# Patient Record
Sex: FEMALE | NOT HISPANIC OR LATINO | Employment: UNEMPLOYED | URBAN - METROPOLITAN AREA
[De-identification: names, ages, dates, MRNs, and addresses within clinical notes are randomized per-mention and may not be internally consistent; named-entity substitution may affect disease eponyms.]

---

## 2023-12-25 ENCOUNTER — HOSPITAL ENCOUNTER (EMERGENCY)
Facility: HOSPITAL | Age: 53
Discharge: HOME | End: 2023-12-25
Attending: STUDENT IN AN ORGANIZED HEALTH CARE EDUCATION/TRAINING PROGRAM
Payer: COMMERCIAL

## 2023-12-25 VITALS
OXYGEN SATURATION: 100 % | HEIGHT: 67 IN | TEMPERATURE: 98.4 F | WEIGHT: 208.34 LBS | RESPIRATION RATE: 18 BRPM | SYSTOLIC BLOOD PRESSURE: 168 MMHG | DIASTOLIC BLOOD PRESSURE: 87 MMHG | BODY MASS INDEX: 32.7 KG/M2 | HEART RATE: 91 BPM

## 2023-12-25 DIAGNOSIS — S05.02XA ABRASION OF LEFT CORNEA, INITIAL ENCOUNTER: Primary | ICD-10-CM

## 2023-12-25 DIAGNOSIS — S05.90XA EYE INJURY, UNSPECIFIED LATERALITY, INITIAL ENCOUNTER: ICD-10-CM

## 2023-12-25 PROCEDURE — 2500000001 HC RX 250 WO HCPCS SELF ADMINISTERED DRUGS (ALT 637 FOR MEDICARE OP): Performed by: PHYSICIAN ASSISTANT

## 2023-12-25 PROCEDURE — 99283 EMERGENCY DEPT VISIT LOW MDM: CPT | Performed by: STUDENT IN AN ORGANIZED HEALTH CARE EDUCATION/TRAINING PROGRAM

## 2023-12-25 PROCEDURE — 2500000001 HC RX 250 WO HCPCS SELF ADMINISTERED DRUGS (ALT 637 FOR MEDICARE OP): Performed by: STUDENT IN AN ORGANIZED HEALTH CARE EDUCATION/TRAINING PROGRAM

## 2023-12-25 RX ORDER — TOBRAMYCIN 3 MG/ML
2 SOLUTION/ DROPS OPHTHALMIC EVERY 4 HOURS
Qty: 5 ML | Refills: 0 | Status: SHIPPED | OUTPATIENT
Start: 2023-12-25 | End: 2024-01-08

## 2023-12-25 RX ORDER — TETRACAINE HYDROCHLORIDE 5 MG/ML
2 SOLUTION OPHTHALMIC ONCE
Status: COMPLETED | OUTPATIENT
Start: 2023-12-25 | End: 2023-12-25

## 2023-12-25 RX ORDER — IBUPROFEN 600 MG/1
600 TABLET ORAL ONCE
Status: COMPLETED | OUTPATIENT
Start: 2023-12-25 | End: 2023-12-25

## 2023-12-25 RX ORDER — DICLOFENAC SODIUM 1 MG/ML
1 SOLUTION/ DROPS OPHTHALMIC 4 TIMES DAILY
Qty: 2.5 ML | Refills: 0 | Status: SHIPPED | OUTPATIENT
Start: 2023-12-25 | End: 2024-01-04

## 2023-12-25 RX ADMIN — FLUORESCEIN SODIUM 1 STRIP: 1 STRIP OPHTHALMIC at 12:49

## 2023-12-25 RX ADMIN — IBUPROFEN 600 MG: 600 TABLET, FILM COATED ORAL at 13:37

## 2023-12-25 RX ADMIN — TETRACAINE HYDROCHLORIDE 2 DROP: 5 SOLUTION OPHTHALMIC at 12:49

## 2023-12-25 ASSESSMENT — PAIN - FUNCTIONAL ASSESSMENT: PAIN_FUNCTIONAL_ASSESSMENT: 0-10

## 2023-12-25 ASSESSMENT — PAIN DESCRIPTION - ORIENTATION: ORIENTATION: LEFT

## 2023-12-25 ASSESSMENT — PAIN SCALES - GENERAL
PAINLEVEL_OUTOF10: 0 - NO PAIN
PAINLEVEL_OUTOF10: 4

## 2023-12-25 ASSESSMENT — COLUMBIA-SUICIDE SEVERITY RATING SCALE - C-SSRS
6. HAVE YOU EVER DONE ANYTHING, STARTED TO DO ANYTHING, OR PREPARED TO DO ANYTHING TO END YOUR LIFE?: NO
1. IN THE PAST MONTH, HAVE YOU WISHED YOU WERE DEAD OR WISHED YOU COULD GO TO SLEEP AND NOT WAKE UP?: NO
2. HAVE YOU ACTUALLY HAD ANY THOUGHTS OF KILLING YOURSELF?: NO

## 2023-12-25 ASSESSMENT — VISUAL ACUITY: OS: 20/40

## 2023-12-25 ASSESSMENT — PAIN DESCRIPTION - LOCATION: LOCATION: EYE

## 2023-12-25 NOTE — ED PROVIDER NOTES
Supervisory note:  Patient seen in conjunction with BRANDEN Chatterjee.  Patient presents with left eye injury.  She states that last night at about 8:00, she accidentally stabbed herself in the left eye with a pair of scissors.  She reports that the tip of the scissors went into the medial/upper portion of the eye under the eyelid.  Since then, she has been having pain.  She states that this morning when she opened her eye, she felt a gush of fluid from the eye.  She does normally use glasses.  She denies double vision and reports that her vision is normal from the eye.  On examination, there is conjunctival injection of the left eye.  Pupils are equal, round, reactive to light.  Extraocular motions are intact.  Honorio sign negative.  Corneal abrasion seen with some fluorescein uptake.  Patient treated with antibiotics and was advised to follow-up with ophthalmology.  Return precautions given for any worsening symptoms.  I personally saw the patient and made/approved the management plan and take responsiblity for the patient management.  Parts of this chart were completed with dictation software, please excuse any errors in transcription.     Sheng Mancini MD  12/25/23 8323

## 2023-12-25 NOTE — ED PROVIDER NOTES
HPI   Chief Complaint   Patient presents with    Eye Trauma     Scissors to left eye at 2000 last pm, unintentionally. Drainage present, this was accidental, denies any other s/s       53-year-old female presented emergency department with a chief complaint of injury to left eye.  She complains of foreign body sensation in left eye after she accidentally hit it with a knife last night while packing gives.  She states that her vision is unchanged.  She is sensitive to light.  She denies pain with extraocular movement.  She does wear glasses.  She is unsure of tetanus status.  Well-appearing nontoxic.  No other complaint.                          No data recorded                Patient History   Past Medical History:   Diagnosis Date    Personal history of other diseases of the female genital tract     History of ovarian cyst     Past Surgical History:   Procedure Laterality Date    OTHER SURGICAL HISTORY  2019    Hysterectomy    OTHER SURGICAL HISTORY  2019     section    OTHER SURGICAL HISTORY  2019    Cholecystectomy     No family history on file.  Social History     Tobacco Use    Smoking status: Not on file    Smokeless tobacco: Not on file   Substance Use Topics    Alcohol use: Not on file    Drug use: Not on file       Physical Exam   ED Triage Vitals [23 1243]   Temp Heart Rate Resp BP   36.9 °C (98.4 °F) 99 18 (!) 163/98      SpO2 Temp Source Heart Rate Source Patient Position   100 % Oral Monitor --      BP Location FiO2 (%)     -- --       Physical Exam  Vitals and nursing note reviewed.   Constitutional:       Appearance: Normal appearance.   HENT:      Head: Normocephalic and atraumatic.      Nose: Nose normal.      Mouth/Throat:      Mouth: Mucous membranes are moist.   Eyes:      Comments: Patient with evidence of a corneal abrasion to the left lateral portion of.  No pain with extraocular movements, pupils equal round reactive to light.  Visual acuity is at patient's  baseline bilaterally   Cardiovascular:      Rate and Rhythm: Normal rate.   Pulmonary:      Effort: Pulmonary effort is normal.      Breath sounds: Normal breath sounds.   Abdominal:      General: Abdomen is flat.      Palpations: Abdomen is soft.   Musculoskeletal:         General: Normal range of motion.      Cervical back: Normal range of motion.   Skin:     General: Skin is warm.   Neurological:      General: No focal deficit present.      Mental Status: She is alert and oriented to person, place, and time.   Psychiatric:         Mood and Affect: Mood normal.         ED Course & MDM   Diagnoses as of 12/25/23 1334   Abrasion of left cornea, initial encounter   Eye injury, unspecified laterality, initial encounter       Medical Decision Making  I have seen and evaluated this patient.  The attending physician has also seen and evaluated this patient.  Vital signs, laboratory testing and diagnostic images if applicable have been reviewed.  All laboratory and imaging is interpreted by myself unless otherwise stated.  Radiology studies are also formally interpreted by radiologist.    Patient with evidence of corneal abrasion.  Her visual acuity is unchanged from her baseline.  She is started on vomiting antibiotic.  Tetanus updated.  No evidence of globe rupture.  Released in good condition to follow-up with ophthalmology and primary physician.             Your medication list        START taking these medications        Instructions Last Dose Given Next Dose Due   diclofenac 0.1 % ophthalmic solution  Commonly known as: Voltaren      Administer 1 drop into the left eye 4 times a day for 10 days.       tobramycin 0.3 % ophthalmic solution  Commonly known as: Tobrex      Administer 2 drops into the left eye every 4 hours for 14 days.                 Where to Get Your Medications        You can get these medications from any pharmacy    Bring a paper prescription for each of these medications  diclofenac 0.1 %  ophthalmic solution  tobramycin 0.3 % ophthalmic solution           Procedure  Procedures     John Jackman PA-C  12/25/23 2169